# Patient Record
Sex: MALE | Race: WHITE | NOT HISPANIC OR LATINO
[De-identification: names, ages, dates, MRNs, and addresses within clinical notes are randomized per-mention and may not be internally consistent; named-entity substitution may affect disease eponyms.]

---
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
## 2018-06-29 NOTE — PROGRESS NOTE BEHAVIORAL HEALTH - PRN MEDICATIONS SINCE LAST EVAL
Procedure: DEMETRIUS.    No complications.    Results:  1. Severe AS; mild to moderate AI.  2. Very mild AS, mild MR.  3. Normal LV function.  
no

## 2018-10-18 ENCOUNTER — APPOINTMENT (OUTPATIENT)
Dept: PEDIATRIC CARDIOLOGY | Facility: CLINIC | Age: 18
End: 2018-10-18
Payer: COMMERCIAL

## 2018-10-18 VITALS
HEIGHT: 71.06 IN | WEIGHT: 168.65 LBS | DIASTOLIC BLOOD PRESSURE: 64 MMHG | HEART RATE: 74 BPM | BODY MASS INDEX: 23.61 KG/M2 | OXYGEN SATURATION: 100 % | SYSTOLIC BLOOD PRESSURE: 112 MMHG

## 2018-10-18 DIAGNOSIS — R01.1 CARDIAC MURMUR, UNSPECIFIED: ICD-10-CM

## 2018-10-18 DIAGNOSIS — R94.31 ABNORMAL ELECTROCARDIOGRAM [ECG] [EKG]: ICD-10-CM

## 2018-10-18 DIAGNOSIS — E10.65 TYPE 1 DIABETES MELLITUS WITH HYPERGLYCEMIA: ICD-10-CM

## 2018-10-18 DIAGNOSIS — Q23.1 CONGENITAL INSUFFICIENCY OF AORTIC VALVE: ICD-10-CM

## 2018-10-18 PROCEDURE — 93320 DOPPLER ECHO COMPLETE: CPT

## 2018-10-18 PROCEDURE — 93000 ELECTROCARDIOGRAM COMPLETE: CPT

## 2018-10-18 PROCEDURE — 93303 ECHO TRANSTHORACIC: CPT

## 2018-10-18 PROCEDURE — 93325 DOPPLER ECHO COLOR FLOW MAPG: CPT

## 2018-10-18 PROCEDURE — 99215 OFFICE O/P EST HI 40 MIN: CPT | Mod: 25

## 2018-10-18 RX ORDER — EPINEPHRINE 0.3 MG/.3ML
INJECTION INTRAMUSCULAR
Refills: 0 | Status: ACTIVE | COMMUNITY
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
## 2021-04-03 ENCOUNTER — TRANSCRIPTION ENCOUNTER (OUTPATIENT)
Age: 21
End: 2021-04-03

## 2021-04-23 ENCOUNTER — NON-APPOINTMENT (OUTPATIENT)
Age: 21
End: 2021-04-23

## 2021-04-23 ENCOUNTER — APPOINTMENT (OUTPATIENT)
Dept: OTOLARYNGOLOGY | Facility: CLINIC | Age: 21
End: 2021-04-23
Payer: COMMERCIAL

## 2021-04-23 VITALS
DIASTOLIC BLOOD PRESSURE: 62 MMHG | WEIGHT: 165 LBS | HEART RATE: 69 BPM | HEIGHT: 72 IN | TEMPERATURE: 98 F | BODY MASS INDEX: 22.35 KG/M2 | SYSTOLIC BLOOD PRESSURE: 96 MMHG

## 2021-04-23 DIAGNOSIS — K21.9 GASTRO-ESOPHAGEAL REFLUX DISEASE W/OUT ESOPHAGITIS: ICD-10-CM

## 2021-04-23 DIAGNOSIS — G47.33 OBSTRUCTIVE SLEEP APNEA (ADULT) (PEDIATRIC): ICD-10-CM

## 2021-04-23 DIAGNOSIS — J34.2 DEVIATED NASAL SEPTUM: ICD-10-CM

## 2021-04-23 DIAGNOSIS — R06.83 SNORING: ICD-10-CM

## 2021-04-23 DIAGNOSIS — J30.9 ALLERGIC RHINITIS, UNSPECIFIED: ICD-10-CM

## 2021-04-23 DIAGNOSIS — J03.90 ACUTE TONSILLITIS, UNSPECIFIED: ICD-10-CM

## 2021-04-23 PROCEDURE — 31231 NASAL ENDOSCOPY DX: CPT

## 2021-04-23 PROCEDURE — 99072 ADDL SUPL MATRL&STAF TM PHE: CPT

## 2021-04-23 PROCEDURE — 99204 OFFICE O/P NEW MOD 45 MIN: CPT | Mod: 25

## 2021-04-23 RX ORDER — FLUTICASONE PROPIONATE 50 UG/1
50 SPRAY, METERED NASAL DAILY
Qty: 1 | Refills: 5 | Status: ACTIVE | COMMUNITY
Start: 2021-04-23 | End: 1900-01-01

## 2021-04-23 NOTE — ASSESSMENT
[FreeTextEntry1] : deviated septum\par rhinitis and pnd\par resolved tonsillitis w minima asymmetry\par loud snoring ro sleep apnea\par home sleep study\par trial fluticasone\par consider ppi rx for possible reflux

## 2021-04-23 NOTE — PROCEDURE
[FreeTextEntry6] : Indication for procedure:  Unable to adequately examine mid and posterior nasal cavity with anterior rhinoscopy\par Sinus endoscope # 99\par Nasal septum exam shows septal deviation to the left at valve 3-4 plus\par The inferior nasal turbinates are moderately hypertrophic in size bilaterally.\par The sinus endoscope was introduced into the right nares\par exam right middle meatus reveals no mucopus or inflammation.  The right middle turbinate is WNL.\par The scope was advanced and the sphenoethmoid region was inspected.\par The superior meatus, superior turbinate and nasal vault are unremarkable.  The nasopharynx is unremarkable without inflammation or mass.\par The sinus endoscope was introduced into the left nares and\par exam left middle meatus reveals no mucopus or inflammation.  The left middle turbinate is WNL.\par The scope was advanced and the sphenoethmoid region was inspected.\par The left superior meatus and nasal vault are unremarkable.\par The fiberoptic scope was introduced to the posterior pharynx and exam on endolarynx edema posterior larynx mild erythema w good vocal cord mobility.\par No lesion noted in hypopharynx.\par

## 2021-04-23 NOTE — REVIEW OF SYSTEMS
[Post Nasal Drip] : post nasal drip [Negative] : Heme/Lymph [Patient Intake Form Reviewed] : Patient intake form was reviewed

## 2021-04-23 NOTE — PHYSICAL EXAM
[Nasal Endoscopy Performed] : nasal endoscopy was performed, see procedure section for findings [] : septum deviated to the left [Midline] : trachea located in midline position [Normal] : no rashes [de-identified] : 1 plus reactive jessika nodes [de-identified] : tonsil 1 plus minimal asymmetry no exudate

## 2021-05-26 NOTE — HISTORY OF PRESENT ILLNESS
[de-identified] : 2 weeks ago pharyngitis dx strep\par rx amoxicillin', still w sense of swelling\par no prior hx tonsillitis\par note to have asymmetric tonsils\par co pnd and excessive phlegm throat, co nasal obstruction longstanding worse on rt\par hx nasal trauma as child, seasonal allergies only\par loud snoring and choking in sleep, no co daytime fatigue
kristy

## 2021-05-28 ENCOUNTER — APPOINTMENT (OUTPATIENT)
Dept: OTOLARYNGOLOGY | Facility: CLINIC | Age: 21
End: 2021-05-28

## 2022-10-05 NOTE — BEHAVIORAL HEALTH ASSESSMENT NOTE - GROOMING
"Subjective:      Patient ID: Shira Franks is a 66 y.o. female.    Chief Complaint: Establish Care (She states she has an appt with urology in 11/2022. ) and Results (The patient had a CT done and she noticed it states "atelectasis" of the lung. She is concerned and states she does get SOB, with "pickle ball." )    HPI      Past Medical History:   Diagnosis Date    Anemia     History of herpes simplex infection     Hypothyroidism, unspecified     Kidney stone     Lymphocytic colitis     Osteopenia     Vasovagal syncope     Vitamin D deficiency          Past Surgical History:   Procedure Laterality Date    AUGMENTATION OF BREAST      BUNIONECTOMY Bilateral     COLONOSCOPY      6/24/14,  7/6/22    LIPOSUCTION OF THIGH      REMOVAL OF BREAST IMPLANT  11/2016    and mastopexy    REMOVAL OF BREAST IMPLANT      TRIGGER FINGER RELEASE Left 06/2020        Family History   Problem Relation Age of Onset    Heart failure Mother     Liver disease Mother     COPD Mother     Cancer Father     Heart failure Father     Lung cancer Father     Skin cancer Father        Social History     Socioeconomic History    Marital status:    Tobacco Use    Smoking status: Never     Passive exposure: Never    Smokeless tobacco: Never   Substance and Sexual Activity    Alcohol use: Never    Drug use: Never    Sexual activity: Not Currently     Partners: Male     Birth control/protection: See Surgical Hx     Comment: Hyst       Cardiologist is Dr Marcello Gomez in [place]  Dr Saavedra is her Gastroenterologist  Dr Soliz is Neurologist   Dr Snowden is her gynecologist  She will be established with Dr Rai fir urinary problems     Review of Systems   Constitutional:  Negative for chills, fever and weight loss.   Respiratory:  Positive for shortness of breath. Negative for cough.         Exertional    Cardiovascular:  Negative for chest pain, palpitations and leg swelling.   Gastrointestinal:  Negative for abdominal pain, nausea and vomiting. " "  Genitourinary:  Positive for dysuria and frequency. Negative for urgency.        Cystitis   Musculoskeletal:  Negative for falls.   Skin:  Negative for rash.   Neurological:  Negative for dizziness and headaches.   Endo/Heme/Allergies:  Does not bruise/bleed easily.   Psychiatric/Behavioral:  Negative for depression. The patient is not nervous/anxious.    Objective:     Physical Exam  Vitals reviewed.   Constitutional:       Appearance: Normal appearance.   HENT:      Head: Normocephalic.   Eyes:      Extraocular Movements: Extraocular movements intact.      Conjunctiva/sclera: Conjunctivae normal.      Pupils: Pupils are equal, round, and reactive to light.   Cardiovascular:      Rate and Rhythm: Normal rate and regular rhythm.   Pulmonary:      Effort: Pulmonary effort is normal.      Breath sounds: Normal breath sounds. No wheezing, rhonchi or rales.   Abdominal:      General: Bowel sounds are normal.   Musculoskeletal:      Right lower leg: No edema.      Left lower leg: No edema.   Skin:     General: Skin is warm.      Capillary Refill: Capillary refill takes less than 2 seconds.   Neurological:      General: No focal deficit present.      Mental Status: She is alert and oriented to person, place, and time.   Psychiatric:         Mood and Affect: Mood normal.     /64 (BP Location: Right arm, Patient Position: Sitting, BP Method: Medium (Automatic))   Pulse 66   Ht 5' 3" (1.6 m)   Wt 66.6 kg (146 lb 12.8 oz)   SpO2 98%   BMI 26.00 kg/m²     Assessment:       ICD-10-CM ICD-9-CM   1. Immunization due  Z23 V05.9   2. Wellness examination  Z00.00 V70.0   3. Hypothyroidism, unspecified type  E03.9 244.9       Plan:     Medication List with Changes/Refills   New Medications    CIPROFLOXACIN HCL (CIPRO) 500 MG TABLET    Take 1 tablet (500 mg total) by mouth 2 (two) times daily. for 7 days    ESTRADIOL (VAGIFEM) 10 MCG TAB    Place 1 tablet (10 mcg total) vaginally twice a week.   Current Medications    " ESTROGENS,ESTERIFIED,-METHYLTESTOSTERONE 0.625-1.25MG (ESTRATEST HS) PER TABLET    Take 1 tablet by mouth once daily.    MULTIVIT-MIN-IRON-FA-LUTEIN (CENTRUM SILVER WOMEN) 8 MG IRON-400 MCG-300 MCG TAB    Centrum Silver Women   OTC daily    PANTOPRAZOLE (PROTONIX) 40 MG TABLET    Take 1 tablet (40 mg total) by mouth once daily.    PHENAZOPYRIDINE (PYRIDIUM) 200 MG TABLET    Take 1 tablet (200 mg total) by mouth 3 (three) times daily as needed for Pain.   Changed and/or Refilled Medications    Modified Medication Previous Medication    LEVOTHYROXINE (SYNTHROID) 88 MCG TABLET levothyroxine (SYNTHROID) 88 MCG tablet       Take 1 tablet (88 mcg total) by mouth before breakfast.    Take 88 mcg by mouth before breakfast.   Discontinued Medications    ESTRADIOL (ESTRACE) 0.01 % (0.1 MG/GRAM) VAGINAL CREAM    Place 1 g vaginally twice a week.    SULFAMETHOXAZOLE-TRIMETHOPRIM 800-160MG (BACTRIM DS) 800-160 MG TAB    Take 1 tablet by mouth 2 (two) times daily.        Immunization due  -     (In Office Administered) Pneumococcal Conjugate Vaccine (20 Valent) (IM)    Wellness examination  -     TSH; Future; Expected date: 10/05/2022  -     T4, Free; Future; Expected date: 10/05/2022    Hypothyroidism, unspecified type  -     TSH; Future; Expected date: 10/05/2022  -     T4, Free; Future; Expected date: 10/05/2022  -     levothyroxine (SYNTHROID) 88 MCG tablet; Take 1 tablet (88 mcg total) by mouth before breakfast.  Dispense: 90 tablet; Refill: 3           Future Appointments   Date Time Provider Department Center   11/8/2022  2:50 PM Wing Rai MD [facility] UROLOGY  [address]   12/19/2022 12:15 PM Isabel Saavedra MD [facility] GASTRO  [address]   4/10/2023  9:40 AM Shelia Knott MD [facility]  [address]                   Good

## 2023-05-25 NOTE — DISCHARGE NOTE ADULT - PROVIDER TOKENS
FREE:[LAST:[PMD],PHONE:[(   )    -],FAX:[(   )    -]] Gabapentin Counseling: I discussed with the patient the risks of gabapentin including but not limited to dizziness, somnolence, fatigue and ataxia.

## 2024-01-25 NOTE — DISCHARGE NOTE ADULT - NURSING SECTION COMPLETE
1/25/2024      Eulalia Pompeya Guaman  [address]  [place] [address]        Dear Katy    Thank you for choosing [facility] for your CT Calcium Heart scan. Below is an explanation of the results as well as follow up recommendations from your scan.     Findings:    Your calcium score is 0. There is no identifiable coronary plaque, however a heart healthy lifestyle is recommended to prevent coronary artery disease.  Your scan indicates an additional finding/findings that need to be reviewed with your primary care physician. This information has already been sent to your primary care physician on file, however please contact your physician's office to coordinate follow up.     If you have any questions, please call our Heart  at,   [phone].       Sincerely,    [facility] CT Heart Scan Program   Patient/Caregiver provided printed discharge information.

## 2025-01-15 ENCOUNTER — NON-APPOINTMENT (OUTPATIENT)
Age: 25
End: 2025-01-15